# Patient Record
Sex: MALE | Race: WHITE | URBAN - METROPOLITAN AREA
[De-identification: names, ages, dates, MRNs, and addresses within clinical notes are randomized per-mention and may not be internally consistent; named-entity substitution may affect disease eponyms.]

---

## 2017-02-10 ENCOUNTER — TRANSFERRED RECORDS (OUTPATIENT)
Dept: HEALTH INFORMATION MANAGEMENT | Facility: CLINIC | Age: 77
End: 2017-02-10

## 2017-02-12 ENCOUNTER — TRANSFERRED RECORDS (OUTPATIENT)
Dept: HEALTH INFORMATION MANAGEMENT | Facility: CLINIC | Age: 77
End: 2017-02-12

## 2017-04-05 ENCOUNTER — TRANSFERRED RECORDS (OUTPATIENT)
Dept: HEALTH INFORMATION MANAGEMENT | Facility: CLINIC | Age: 77
End: 2017-04-05

## 2017-05-03 ENCOUNTER — TRANSFERRED RECORDS (OUTPATIENT)
Dept: HEALTH INFORMATION MANAGEMENT | Facility: CLINIC | Age: 77
End: 2017-05-03

## 2017-07-07 ENCOUNTER — TELEPHONE (OUTPATIENT)
Dept: TRANSPLANT | Facility: CLINIC | Age: 77
End: 2017-07-07

## 2017-07-07 NOTE — TELEPHONE ENCOUNTER
Send selection committee note from 12/2/2015 faxed to dialysis [phone].  Pt is on dialysis now started in 2/2017  7290 form arrived give to Mi to file in [facility].

## 2017-07-19 ENCOUNTER — TRANSFERRED RECORDS (OUTPATIENT)
Dept: HEALTH INFORMATION MANAGEMENT | Facility: CLINIC | Age: 77
End: 2017-07-19

## 2017-08-28 ENCOUNTER — TRANSFERRED RECORDS (OUTPATIENT)
Dept: HEALTH INFORMATION MANAGEMENT | Facility: CLINIC | Age: 77
End: 2017-08-28

## 2017-09-01 ENCOUNTER — TELEPHONE (OUTPATIENT)
Dept: TRANSPLANT | Facility: CLINIC | Age: 77
End: 2017-09-01

## 2017-09-01 NOTE — TELEPHONE ENCOUNTER
"Willing donor called asking how to donate directly with Alessandro Moses.  I connected donor with the donor number.    I called [facility] [phone]; Alessandro Moses could not do Home Hemo and was transferred back to [facility] in [facility] unit MWF 5-9 am.  [phone]  [phone].      I called [facility] Pt has Sg Clark MD Nephrologist. I asked for recent note and HT 70\" WT 119kg. (262#) BMI 37.      Alessandro Moses was not placed on Wait List in 2015 as his BMI was greater than team allowed and his heart needed assessment.    He understands that there will need to be updating prior to him being approved as kidney candidate.  I sent this note to the donor team.     PLAN   1) Called Alessandro to discuss his current situation.  He has lost weight and is HT 5'10\" .6/(262) BMI 37   2) I will send Romy's note of 2015 to ALANA Bennett to review for updating his eval in 2017  3) Sent Note to Sofia Ferguson asking for INS update on Alessandro Moses for updating evaluation and kidney tx/ ( pt is now on Hemodialysis 2/12/2017)  4) PRA needed   5) RTC to see Dr. Patel ( obtain outside fax stress echocardiogram and Cards note from [facility]/HP from Mariam or Sofia hines)  "

## 2017-09-05 ENCOUNTER — DOCUMENTATION ONLY (OUTPATIENT)
Dept: TRANSPLANT | Facility: CLINIC | Age: 77
End: 2017-09-05

## 2017-09-11 ENCOUNTER — DOCUMENTATION ONLY (OUTPATIENT)
Dept: TRANSPLANT | Facility: CLINIC | Age: 77
End: 2017-09-11

## 2017-09-18 ENCOUNTER — TELEPHONE (OUTPATIENT)
Dept: TRANSPLANT | Facility: CLINIC | Age: 77
End: 2017-09-18

## 2017-09-18 DIAGNOSIS — E11.9 DIABETES MELLITUS, TYPE 2 (H): ICD-10-CM

## 2017-09-18 DIAGNOSIS — Z76.82 ORGAN TRANSPLANT CANDIDATE: ICD-10-CM

## 2017-09-18 DIAGNOSIS — N18.6 ESRD (END STAGE RENAL DISEASE) (H): Primary | ICD-10-CM

## 2017-09-18 NOTE — LETTER
September 19, 2017    Bety Moses  [address]  [facility] [address]      Dear Mr. Moses,    Attached is your Pre Kidney Evaluation schedule on November 9, 2017. Please feel free to contact at [phone], me if you have any questions.      Sincerely,   Shivani JENNINGS    CC:Kallie BAY                                                  [facility]  [address] [place] [address]  [id number]        EVALUATION SCHEDULE: KIDNEY TRANSPLANT SLOT 2 EVAL      Patient:   BETY MOSES   MR#:    [id number]  Coordinator:  Kallie BAY     [phone]  :   Shivani JENNINGS     [phone]  Location:   [facility] Clinic 3A  Date:    November 9, 2017      This is your evaluation schedule, please follow dates and times.  You  will receive reminder phone calls for other tests, but please follow this  schedule only!  If you have any questions about dates and times,  please call us on number listed above.  Thank you, Transplant Clinic.       Day/Date:  Thursday, November 9, 2017  Time Location Activity   8:30a.m. [facility]  Imaging and Lab Testing  1st floor Blood tests    9:00a.m. [facility]  Transplant Services  3rd floor; Clinic 3B Appointment with Dr. Barajas,  Transplant Nephrologist   9:45a.m.-  10:15a.m. [facility]  Transplant Services  3rd floor; Clinic 3A; Consult Room Appointment with Maria Eugenia Negro,  Registered Dietitian   11:00a.m. [facility]  Transplant Services  3rd floor; Clinic 3A Appointment with Dr. Qureshi,  Transplant Surgeon

## 2017-09-18 NOTE — PROGRESS NOTES
ALANA Bennett reviewed previous evaluation and advised the following appts:  Surgery,   Maria Eugenia, and   Nephrology [facility]   cardiology, RTC SALINAS after the block day, to determine if he is still a candidate or not.  PRA     I called Alessandro Moses 9/18/2017 to discuss the Wait list in [facility].  Mr. Moses states he is on the [facility] Wait List.  This is a different list from [facility].  I informed Alessandro he is not on the [facility] Wait list.  The team recommends he have the above appts first to determine if he is candidate.  I faxed the Wait Time transfer form to him at his office fax: [phone].  I asked him to call Kallie when it arrives to discuss his options.  He has right to keep his time at [facility] ([facility] program with [employer] regulations.  When [facility] determines if he is a candidate then he can transfer time to our program.  If [facility] does not approve him then he can continue to work with [facility].  Lillian Johnson     Orders placed to Shivani 9/18/2017 for above items.

## 2017-09-20 ENCOUNTER — COMMITTEE REVIEW (OUTPATIENT)
Dept: TRANSPLANT | Facility: CLINIC | Age: 77
End: 2017-09-20

## 2017-09-20 NOTE — COMMITTEE REVIEW
Abdominal Patient Discussion Note Transplant Coordinator: Kallie Hinton  Transplant Surgeon:   Matias Mcginnis    Referring Physician: Lester Perry    Committee Review Members:  Nephrology Jono Montes MD, Romain Samuel, APRN CNP, Jose Elias Banks MD   Nutrition Joanne Negro, RD   Pharmacy Luly Trinh, [facility]    - Clinical Jigna Luque, [facility], Monserrat Downs, [facility]   Transplant Lillian White PA-C, Maddy Newman LPN, Marilyn Zheng, CLARENCE, Prema Khan, PAULETTE, Kallie Hinton, RN, Alyson España, PAULETTE, Alyson Pride, PAULETTE, Maria Eugenia Echevarria, PAULETTE, Sven Ortiz MD       Additional Discussion Notes and Findings:      Recommends he sees surgery, Nephrology team member, Cards and PRA   Rediscussion once these testings are done     Appts in Epic for 11/9/2017  Lab 8:30 am ok to eat    Dietary 9am  9:45  nephrology   Surgeon 10:30     I called Berry and MARIA ELENA stating that the team will rediscuss his case Wednesday after this reevaluation day.

## 2017-09-21 ENCOUNTER — TELEPHONE (OUTPATIENT)
Dept: TRANSPLANT | Facility: CLINIC | Age: 77
End: 2017-09-21

## 2017-09-22 DIAGNOSIS — Z76.82 ORGAN TRANSPLANT CANDIDATE: ICD-10-CM

## 2017-09-22 DIAGNOSIS — N18.6 END STAGE RENAL DISEASE (H): ICD-10-CM

## 2017-09-27 ENCOUNTER — CARE COORDINATION (OUTPATIENT)
Dept: CARDIOLOGY | Facility: CLINIC | Age: 77
End: 2017-09-27

## 2017-09-27 DIAGNOSIS — Z01.810 PRE-OPERATIVE CARDIOVASCULAR EXAMINATION: ICD-10-CM

## 2017-09-27 DIAGNOSIS — Z01.818 PRE-TRANSPLANT EVALUATION FOR KIDNEY TRANSPLANT: ICD-10-CM

## 2017-09-27 DIAGNOSIS — I20.89 EXERTIONAL ANGINA (H): Primary | ICD-10-CM

## 2017-09-27 NOTE — PROGRESS NOTES
Patient is being followed by cardiology for cardiac evaluation prior to possible kidney transplant.  Outside records reviewed by Dr. Patel.  Dr. Patel is recommending a Lexiscan to assess heart.  Patient notified and is agreeable to plan.  Patient would like to have this done through his local cardiologist and [facility].  Orders sent to patient via e-mail. Patient given contact number and all questions answered to patient's satisfaction.  Patient will have results faxed to Dr. Patel here in clinic.

## 2017-11-02 ENCOUNTER — TELEPHONE (OUTPATIENT)
Dept: TRANSPLANT | Facility: CLINIC | Age: 77
End: 2017-11-02

## 2017-11-02 NOTE — TELEPHONE ENCOUNTER
Alessandro Solorio is faxing his jam; please give to me. I will need to fax to Dr. Patel's office too. Jam Lama to come to you Dr. Patel soon.  I understand Dr. Patel cannot see it in care everywhere.  If you need more records, let me know I can call Mariam  at .   Kallie     Alessandro called to report he met [facility]. He has appt with Dr. Jenn Del Cid, Dr. Qureshi on 11/9.  Dr. Jenn Mancia

## 2017-11-03 ENCOUNTER — TELEPHONE (OUTPATIENT)
Dept: TRANSPLANT | Facility: CLINIC | Age: 77
End: 2017-11-03

## 2017-11-03 NOTE — TELEPHONE ENCOUNTER
Lexiscan report was incomplete fax. I called Mariam  [phone] for Chantal and Cards notes, she will fax cards appt note of 9/2016, 5/2017 and chantal from 8/5/2016 to [place] office.  I will send to scanning.

## 2017-11-08 ENCOUNTER — TELEPHONE (OUTPATIENT)
Dept: TRANSPLANT | Facility: CLINIC | Age: 77
End: 2017-11-08

## 2017-11-08 NOTE — TELEPHONE ENCOUNTER
Alsesandro called asking what his appt times are for Thursday 11/9/2017.  I informed him of times.

## 2017-11-09 ENCOUNTER — OFFICE VISIT (OUTPATIENT)
Dept: TRANSPLANT | Facility: CLINIC | Age: 77
End: 2017-11-09
Attending: INTERNAL MEDICINE
Payer: MEDICARE

## 2017-11-09 VITALS
DIASTOLIC BLOOD PRESSURE: 68 MMHG | HEIGHT: 71 IN | OXYGEN SATURATION: 99 % | WEIGHT: 252.8 LBS | SYSTOLIC BLOOD PRESSURE: 121 MMHG | HEART RATE: 101 BPM | BODY MASS INDEX: 35.39 KG/M2 | TEMPERATURE: 98.4 F

## 2017-11-09 DIAGNOSIS — Z76.82 ORGAN TRANSPLANT CANDIDATE: ICD-10-CM

## 2017-11-09 DIAGNOSIS — E27.9 ADRENAL NODULE (H): ICD-10-CM

## 2017-11-09 DIAGNOSIS — N18.6 END STAGE KIDNEY DISEASE (H): Primary | ICD-10-CM

## 2017-11-09 DIAGNOSIS — I25.118 CORONARY ARTERY DISEASE OF NATIVE HEART WITH STABLE ANGINA PECTORIS, UNSPECIFIED VESSEL OR LESION TYPE (H): ICD-10-CM

## 2017-11-09 DIAGNOSIS — N18.6 ESRD (END STAGE RENAL DISEASE) (H): Primary | ICD-10-CM

## 2017-11-09 DIAGNOSIS — E66.01 MORBID OBESITY (H): ICD-10-CM

## 2017-11-09 DIAGNOSIS — Z01.818 PRE-TRANSPLANT EVALUATION FOR ESRD (END STAGE RENAL DISEASE): ICD-10-CM

## 2017-11-09 DIAGNOSIS — E11.9 DIABETES MELLITUS, TYPE 2 (H): ICD-10-CM

## 2017-11-09 DIAGNOSIS — Z76.82 ORGAN TRANSPLANT CANDIDATE: Primary | ICD-10-CM

## 2017-11-09 PROCEDURE — 36415 COLL VENOUS BLD VENIPUNCTURE: CPT | Performed by: PHYSICIAN ASSISTANT

## 2017-11-09 NOTE — PROGRESS NOTES
RE: Alessandro Moses    [facility]# [id number]        I saw your patient, Alessandro Moses, in consultation in our pretransplant clinic.  He was at clinic to discuss care for his end-stage renal disease.  He was at clinic with his wife.  He had previously been at class, so did not attendour pretransplant class today.       We talked about the pros and cons of transplantation vs. dialysis.  We discussed the fact that it was important that he think about the pros and cons of each treatment option and make an active decision.  We also discussed the fact that the two were interconnected and he may need to go on dialysis before transplant (if he chose to have a transplant) and that if the transplant failed, he might need dialysis before another transplant.       We also discussed the fact that if he chose to have a transplant, he would need to decide between going on the wait list for a  donor transplant vs. having a living donor transplant.  We talked about the pros and cons of each option.  Although I didn't express an opinion regarding transplantation or dialysis, I suggested that if he chose to have a transplant, a living donor transplant would be preferable in that the surgery is the same, the immunosuppressive drugs and the risks are the same, but the transplant could be done sooner and the results are better.  I told him that the wait for  donor kidney was approximately five years for patients who are newly put on the waiting list.  In addition, we talked about the fact that the disadvantage of a living donor transplant was the risk to the donor.       Because he was interested in living donation, we spent some time discussing donor risks, including the risks of mortality, morbidity, and long-term risks with a single kidney.     Finally, we had considerable discussion as to whether or not he would benefit by moving forward with a transplant. I asked him to discuss this with his nephrologist. This is  "important given he says that he is tolerating dialysis well. Given his age and the current waiting time for a  donor transplant, I would think that the only option would be a living donor transplant. However, I think that this is realistically a \"+/-\" choice for him.     I attempted to answer any remaining questions.  I also told him that should he have any questions, he should feel free to contact us.  We would be glad to answer any questions either over the phone or at another clinic visit.  His transplant coordinator is Kallie Hinton and may be reached at [phone].  Thank you for the opportunity to see him.     I spent 25 minutes with this patient.  Over 90% of that time was spent in counseling and coordination of care.             Yours truly,               Paresh Qureshi MD         Professor of Surgery         ([phone])    RASHID/  "

## 2017-11-09 NOTE — MR AVS SNAPSHOT
After Visit Summary   11/9/2017    Alessandro Moses    MRN: [id number]           Patient Information     Date Of Birth          1940        Visit Information        Provider Department      11/9/2017 9:45 AM Joanne Negro RD [facility] Solid Organ Transplant        Today's Diagnoses     Organ transplant candidate    -  1       Follow-ups after your visit        Your next 10 appointments already scheduled     Nov 30, 2017 12:30 PM CST   (Arrive by 12:15 PM)   Return Visit with Jose Patel MD   [facility]    [address] [place] [address] [phone]              Who to contact     If you have questions or need follow up information about today's clinic visit or your schedule please contact [facility] SOLID ORGAN TRANSPLANT directly at [phone].  Normal or non-critical lab and imaging results will be communicated to you by allyDVMhart, letter or phone within 4 business days after the clinic has received the results. If you do not hear from us within 7 days, please contact the clinic through allyDVMhart or phone. If you have a critical or abnormal lab result, we will notify you by phone as soon as possible.  Submit refill requests through Enviable Abode or call your pharmacy and they will forward the refill request to us. Please allow 3 business days for your refill to be completed.          Additional Information About Your Visit        MyChart Information     Enviable Abode gives you secure access to your electronic health record. If you see a primary care provider, you can also send messages to your care team and make appointments. If you have questions, please call your primary care clinic.  If you do not have a primary care provider, please call [phone] and they will assist you.        Care EveryWhere ID     This is your Care EveryWhere ID. This could be used by other organizations to access your [facility]  records  [id number]         Blood Pressure from Last 3 Encounters:   11/09/17 121/68   03/22/16 157/75   03/14/16 148/74    Weight from Last 3 Encounters:   11/09/17 114.7 kg (252 lb 12.8 oz)   03/22/16 131.1 kg (289 lb)   03/14/16 126.6 kg (279 lb)              Today, you had the following     No orders found for display       Primary Care Provider Office Phone # Fax #    Hebert Anand [phone]       [facility] [address]  [facility] [address]        Equal Access to Services     [facility]: Hadii benjamin bill hadsigifredoo Somyrtle, waaxda luqadaha, qaybta kaalmada adeelieryada, leah nathan . So [facility] [phone].    ATENCIÓN: Si habla español, tiene a reno disposición servicios gratuitos de asistencia lingüística. [facility] [phone].    We comply with applicable federal civil rights laws and [place] laws. We do not discriminate on the basis of race, color, national origin, age, disability, sex, sexual orientation, or gender identity.            Thank you!     Thank you for choosing [facility] SOLID ORGAN TRANSPLANT  for your care. Our goal is always to provide you with excellent care. Hearing back from our patients is one way we can continue to improve our services. Please take a few minutes to complete the written survey that you may receive in the mail after your visit with us. Thank you!             Your Updated Medication List - Protect others around you: Learn how to safely use, store and throw away your medicines at www.disposemymeds.org.          This list is accurate as of: 11/9/17  2:07 PM.  Always use your most recent med list.                   Brand Name Dispense Instructions for use Diagnosis    alfuzosin 10 MG 24 hr tablet    UROXATRAL     Take 10 mg by mouth        allopurinol 100 MG tablet    ZYLOPRIM     TAKE TWO TABLETS BY MOUTH ONCE DAILY        aspirin 81 MG chewable tablet      Take 81 mg by mouth        blood glucose monitoring test strip     no brand specified     Test 6-7 times/day using insulin pump.  [employer] WARNER approved 7/8/14  Uses Free style test strips        clopidogrel 75 MG tablet    PLAVIX     TAKE ONE TABLET BY MOUTH EVERY DAY        CRESTOR 10 MG tablet   Generic drug:  rosuvastatin      TAKE ONE TABLET BY MOUTH ONCE DAILY        doxazosin 8 MG tablet    CARDURA     Take 4 mg by mouth        finasteride 5 MG tablet    PROSCAR     Take 5 mg by mouth        fluticasone 50 MCG/BLIST Aepb    FLOVENT DISKUS     Inhale 1 puff into the lungs every 12 hours        insulin reg HIGH CONC 500 Units/mL injection    HumuLIN R U-500 HIGH CONC     Use 35-40 units Regular U-500 insulin per day via insulin pump (actual 175-200 units/day)        Isosorbide Mononitrate  MG Tb24      Take 120 mg by mouth        losartan 100 MG tablet    COZAAR     Take 100 mg by mouth        Multiple vitamin Tabs           nitroGLYcerin 0.4 MG sublingual tablet    NITROSTAT     Place 1 tab under tongue as needed for chest pain up to 3 doses, 5 min appart        omega 3 1200 MG Caps           oxyCODONE-acetaminophen 5-325 MG per tablet    PERCOCET     Take by mouth every 4 hours as needed for moderate to severe pain        RANEXA 500 MG 12 hr tablet   Generic drug:  ranolazine      Take 1,000 mg by mouth 2 times daily        RENVELA PO           torsemide 20 MG tablet    DEMADEX     Take 20mg in morning and 20 mg  in evening        VITAMIN D3 PO      Take by mouth daily

## 2017-11-09 NOTE — MR AVS SNAPSHOT
After Visit Summary   11/9/2017    Alessandro Moses    MRN: [id number]           Patient Information     Date Of Birth          1940        Visit Information        Provider Department      11/9/2017 9:00 AM BRIGITTE DELANEY PATIENT 2 [facility] Solid Organ Transplant        Today's Diagnoses     End stage kidney disease (H)    -  1    Pre-transplant evaluation for ESRD (end stage renal disease)        Coronary artery disease of native heart with stable angina pectoris, unspecified vessel or lesion type (H)        Morbid obesity (H)        Adrenal nodule (H)           Follow-ups after your visit        Your next 10 appointments already scheduled     Nov 30, 2017 12:30 PM CST   (Arrive by 12:15 PM)   Return Visit with Jose Patel MD   [facility])    [address] [place] [address] [phone]              Who to contact     If you have questions or need follow up information about today's clinic visit or your schedule please contact [facility] SOLID ORGAN TRANSPLANT directly at [phone].  Normal or non-critical lab and imaging results will be communicated to you by Cell-A-Spothart, letter or phone within 4 business days after the clinic has received the results. If you do not hear from us within 7 days, please contact the clinic through Calibrust or phone. If you have a critical or abnormal lab result, we will notify you by phone as soon as possible.  Submit refill requests through VISENZE or call your pharmacy and they will forward the refill request to us. Please allow 3 business days for your refill to be completed.          Additional Information About Your Visit        Cell-A-Spothart Information     VISENZE gives you secure access to your electronic health record. If you see a primary care provider, you can also send messages to your care team and make appointments. If you have questions, please call your primary care clinic.  If you do  "not have a primary care provider, please call [phone] and they will assist you.        Care EveryWhere ID     This is your Care EveryWhere ID. This could be used by other organizations to access your [place] medical records  [id number]        Your Vitals Were     Pulse Temperature Height Pulse Oximetry BMI (Body Mass Index)       101 98.4  F (36.9  C) 1.803 m (5' 11\") 99% 35.26 kg/m2        Blood Pressure from Last 3 Encounters:   11/09/17 121/68   03/22/16 157/75   03/14/16 148/74    Weight from Last 3 Encounters:   11/09/17 114.7 kg (252 lb 12.8 oz)   03/22/16 131.1 kg (289 lb)   03/14/16 126.6 kg (279 lb)              Today, you had the following     No orders found for display       Primary Care Provider Office Phone # Fax #    Hebert Anand [phone]       [place] [address] [address] [facility] [address]        Equal Access to Services     [facility]: Hadii aad ku hadasho Soomaali, waaxda luqadaha, qaybta kaalmada adeegyada, leah nathan . So [facility] [phone].    ATENCIÓN: Si habla español, tiene a reno disposición servicios gratuitos de asistencia lingüística. Llame al [phone].    We comply with applicable federal civil rights laws and [place] laws. We do not discriminate on the basis of race, color, national origin, age, disability, sex, sexual orientation, or gender identity.            Thank you!     Thank you for choosing [facility] SOLID ORGAN TRANSPLANT  for your care. Our goal is always to provide you with excellent care. Hearing back from our patients is one way we can continue to improve our services. Please take a few minutes to complete the written survey that you may receive in the mail after your visit with us. Thank you!             Your Updated Medication List - Protect others around you: Learn how to safely use, store and throw away your medicines at www.disposemymeds.org.          This list is accurate as of: 11/9/17 11:59 " PM.  Always use your most recent med list.                   Brand Name Dispense Instructions for use Diagnosis    alfuzosin 10 MG 24 hr tablet    UROXATRAL     Take 10 mg by mouth        allopurinol 100 MG tablet    ZYLOPRIM     TAKE TWO TABLETS BY MOUTH ONCE DAILY        aspirin 81 MG chewable tablet      Take 81 mg by mouth        blood glucose monitoring test strip    no brand specified     Test 6-7 times/day using insulin pump.  Nancy WARNER approved 7/8/14  Uses Free style test strips        clopidogrel 75 MG tablet    PLAVIX     TAKE ONE TABLET BY MOUTH EVERY DAY        CRESTOR 10 MG tablet   Generic drug:  rosuvastatin      TAKE ONE TABLET BY MOUTH ONCE DAILY        doxazosin 8 MG tablet    CARDURA     Take 4 mg by mouth        finasteride 5 MG tablet    PROSCAR     Take 5 mg by mouth        fluticasone 50 MCG/BLIST Aepb    FLOVENT DISKUS     Inhale 1 puff into the lungs every 12 hours        insulin reg HIGH CONC 500 Units/mL injection    HumuLIN R U-500 HIGH CONC     Use 35-40 units Regular U-500 insulin per day via insulin pump (actual 175-200 units/day)        Isosorbide Mononitrate  MG Tb24      Take 120 mg by mouth        losartan 100 MG tablet    COZAAR     Take 100 mg by mouth        Multiple vitamin Tabs           nitroGLYcerin 0.4 MG sublingual tablet    NITROSTAT     Place 1 tab under tongue as needed for chest pain up to 3 doses, 5 min appart        omega 3 1200 MG Caps           oxyCODONE-acetaminophen 5-325 MG per tablet    PERCOCET     Take by mouth every 4 hours as needed for moderate to severe pain        RANEXA 500 MG 12 hr tablet   Generic drug:  ranolazine      Take 1,000 mg by mouth 2 times daily        RENVELA PO           torsemide 20 MG tablet    DEMADEX     Take 20mg in morning and 20 mg  in evening        VITAMIN D3 PO      Take by mouth daily

## 2017-11-09 NOTE — PROGRESS NOTES
Assessment and Plan:  1. Kidney transplant evaluation - patient is a marginal candidate overall. Benefits of a living donor transplant were discussed.  2. ESKD from presumed diabetic nephropathy - doing well on dialysis since February 2017. We discussed that although transplant may not prolong his life, it may potentially improve his overall quality of life. He is unaware of any potential living donors at this time. He is ABO A and cPRA pending.   3. Type 2 diabetes - currently managed with U500 via insulin pump. No hypoglycemic unawareness. He is aware that he would likely require more insulin post-kidney transplant.  4. Cardiac risk - he has history of significant CAD s/p CABG in 1992 and stenting in 2000, 2004, and 2014. His last stress test in August 2016 showed an anteroseptal defect that was fixed and thought to be consistent with prior MI. His last ECHO in February 2017 showed an EF of 55%, mild aortic stenosis, and severe LA dilatation. PA pressures were not able to be accurately assessed. He is currently on Plavix and aspirin. He would need cardiac risk assessment.   5. BMI 35 but with significant abdominal obesity - we discussed that he may have more weight to lose to be considered for transplant given central obesity. Appreciate transplant surgery and dietician input. We discussed importance of weight loss for overall general health and to help reduce postoperative wound complications.  6. PVD and possible claudication symptoms - imaging 10+ years ago indicated mild occlusive disease of his BLE. He would need further iliac/femoral imaging to assess vascular targets for transplant.  7. Functional status - limited to walking 25-50 feet by chronic exertional dyspnea and BLE peripheral neuropathy. He is currently following with neurology for peripheral neuropathy and not taking any medications for this. Encouraged him to continue following with neurology. Symptoms may be due to claudication, and as above, he  would need iliac/femoral imaging should he be a transplant candidate. His symptoms could be addressed by his PCP if he is not a candidate.   8. Adrenal adenoma - stable in size since at least 2009. Followed by his local endocrinologist.  9. Health maintenance - would need records of most recent colonoscopy to determine if it is up to date.     Discussed the risks and benefits of a transplant, including the risk of surgery and immunosuppression medications.  Patients overall evaluation will be discussed in the Transplant Program's regular meeting with a final recommendation on the patients suitability for transplant to be made at that time.  Patient was seen in conjunction with Dr. Williams Montes as part of a shared visit.      Evaluation:  Alessandro Moses was seen in consultation at the request of Dr. Paresh Qureshi for evaluation as a potential kidney transplant recipient.    Reason for Visit:  Alessandro Moses is a 77-year-old male with ESKD from presumed diabetic nephropathy, who presents for kidney transplant evaluation.    HPI:  Alessandro Moses is a 77-year-old male with ESKD from presumed diabetic nephropathy on dialysis since February 2017, type 2 diabetes since 2000 (also complicated by peripheral neuropathy), CAD s/p 2 vessel CABG in 1992 and stenting post-CABG (2000, 2004, and 2014), PVD (mild occlusive disease BLE on 2003 arterial doppler), hypertension, obesity, adrenal adenoma, nephrolithiasis (s/p basket retrieval 2003), BENNY, chronic back pain s/p T7-L1 spinal fusion and history of laminectomy and foraminotomies, gout (on allopurinol), and BPH who presents for continued kidney transplant evaluation. He was originally evaluated in May 2015 but not listed due to high BMI (40 around time of initial evaluation).    He has since started dialysis in February 2017, which has been going well. He is currently using an AVF and still has a CVC in place. He has a small amount of urine output. No dysuria, hematuria, or  trouble emptying his bladder. He has intentionally lost 55 lbs, some of which he believes was fluid weight that came off with initiation of dialysis. His appetite has been down some since starting dialysis but he still manages to eat several small meals per day. He has intermittent nausea and vomiting. No diarrhea or constipation. Nausea improves with eating or drinking something and does not affect his ability to take his medications. He is currently using an insulin pump with U500. He checks blood sugars 3-5 times per day with typical sugars averaging around 130-140. He continues to follow with endocrinology for this, as well as for a right-sided adrenal adenoma that has been stable in size since at least 2009. He recently fell and sustained a T4/5 fracture that is being managed conservatively by neurosurgery and does not limit him physically. He is, however, limited physically by BLE peripheral neuropathy and chronic exertional dyspnea that has been present for several years. He is only able to walk 25-50 feet before needing to rest. He also notes that he experiences BLE pain with exertion that is different in quality from his peripheral neuropathy. He has not experienced chest pain with exertion since starting on ranolazine in 2015. He has not required sublingual nitroglycerin for several months. His last coronary angiogram requiring stenting was in December 2014 and showed LMCA 20% stenosis, 40% stenosis mid and distal LAD after LIMA touchdown, LCx stents patent, and proximal RCA 50-60% stenosis (JOSE D at that time). His last stress test in August 2016 showed an anteroseptal defect that was fixed and thought to be consistent with prior MI. His last ECHO in February 2017 showed an EF of 55%, mild aortic stenosis, and severe LA dilatation. PA pressures were not able to be accurately assessed. He is currently on Plavix and aspirin. Overall, he is doing okay. 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 He is also quite active in the community. His energy level is stable but does dip on dialysis days. He remains interested in transplant for improved quality of life.         Kidney Disease Hx:        Kidney Disease Dx: presumed diabetic nephropathy       Biopsy Proven: No         On Dialysis: Yes, Date initiated: February 2017 and Dialysis Type: Incenter HD;       Primary Nephrologist: Dr. Clark         [facility] Hx:       h/o HTN: Yes         h/o DM:  Yes        h/o Protein in Urine: Yes        h/o Blood in Urine:  No       h/o Kidney Stones:  Yes        h/o UTI: No       h/o Chronic NSAID Use: No         Previous Transplant Hx:        No         Transplant Sensitization Hx:       Previous Tx: No       Blood Transfusion: Does not know             Uremic Symptoms:       Fatigue: Yes; Cold: No; Nausea: No; Poor Appetite: No; Metallic Taste: No; Edema: No;         Cardiovascular Hx:       h/o Cardiac Issues: Yes; CAD s/p CABG and stenting       Exercise Tolerance: shortness of breath with exertion.         Health Maintenance:       Colonoscopy: need records and Dental: need records         Potential Donor(s): No    ROS:  A comprehensive review of systems was obtained and negative, except as noted in the HPI or PMH.    PMH:   Medical record was reviewed and PMH was discussed with patient and noted below.  Past Medical History:   Diagnosis Date     Adrenal adenoma      Anemia in CKD (chronic kidney disease)      Angina pectoris (H)      Coronary artery disease      Diabetes mellitus, type II (H)      End stage kidney disease (H)      Gallstones      Gout 6/4/2004     Hyperlipidemia      Hypertension      Kidney stone      Lumbar stenosis      Obesity (BMI 35.0-39.9 without comorbidity)      BENNY (obstructive sleep apnea)      Peripheral vascular disease (H)      Pure hypercholesterolemia      Right lumbar radiculopathy      Secondary hyperparathyroidism (H)        PSH:   Past Surgical History:    Procedure Laterality Date     aortocoronary bypass  1992     CREATE FISTULA ARTERIOVENOUS UPPER EXTREMITY       JOINT REPLACEMENT Bilateral      LAMINECTOMY LUMBAR THREE+ LEVELS  4/29/12    with bilateral foraminotomies L3-L4 and L4-L5     Personal or family history of bleeding or anesthesia problems: No    Family Hx:  Family History   Problem Relation Age of Onset     Coronary Artery Disease Father      DIABETES Father        Personal Hx:   Social History     Social History     Marital status:      Spouse name: N/A     Number of children: N/A     Years of education: N/A     Occupational History     Not on file.     Social History Main Topics     Smoking status: Never Smoker     Smokeless tobacco: Not on file     Alcohol use Not on file     Drug use: Not on file     Sexual activity: Not on file     Other Topics Concern     Not on file     Social History Narrative       Allergies:  Allergies   Allergen Reactions     Hydrocodone-Acetaminophen Nausea and Vomiting       Medications:  Prior to Admission medications    Medication Sig Start Date End Date Taking? Authorizing Provider   Sevelamer Carbonate (RENVELA PO)     Reported, Patient   Cholecalciferol (VITAMIN D3 PO) Take by mouth daily    Reported, Patient   oxyCODONE-acetaminophen (PERCOCET) 5-325 MG per tablet Take by mouth every 4 hours as needed for moderate to severe pain    Reported, Patient   fluticasone (FLOVENT DISKUS) 50 MCG/BLIST AEPB Inhale 1 puff into the lungs every 12 hours    Reported, Patient   allopurinol (ZYLOPRIM) 100 MG tablet TAKE TWO TABLETS BY MOUTH ONCE DAILY 1/22/15   Reported, Patient   clopidogrel (PLAVIX) 75 MG tablet TAKE ONE TABLET BY MOUTH EVERY DAY 8/14/14   Reported, Patient   aspirin 81 MG chewable tablet Take 81 mg by mouth 3/1/13   Reported, Patient   rosuvastatin (CRESTOR) 10 MG tablet TAKE ONE TABLET BY MOUTH ONCE DAILY 4/29/15   Reported, Patient   doxazosin (CARDURA) 8 MG tablet Take 4 mg by mouth  2/13/15   Reported,  "Patient   finasteride (PROSCAR) 5 MG tablet Take 5 mg by mouth 3/23/15   Reported, Patient   insulin reg HIGH CONC (HUMULIN R U-500 HIGH CONC) 500 Units/mL VIAL Use 35-40 units Regular U-500 insulin per day via insulin pump (actual 175-200 units/day) 7/8/14   Reported, Patient   Isosorbide Mononitrate  MG TB24 Take 120 mg by mouth 8/25/14   Reported, Patient   losartan (COZAAR) 100 MG tablet Take 100 mg by mouth 8/25/14   Reported, Patient   Multiple vitamin TABS  10/5/06   Reported, Patient   omega 3 1200 MG CAPS  1/21/15   Reported, Patient   nitroglycerin (NITROSTAT) 0.4 MG SL tablet Place 1 tab under tongue as needed for chest pain up to 3 doses, 5 min appart 12/19/14   Reported, Patient   ranolazine (RANEXA) 500 MG 12 hr tablet Take 1,000 mg by mouth 2 times daily  1/21/15   Reported, Patient   torsemide (DEMADEX) 20 MG tablet Take 20mg in morning and 20 mg  in evening 2/17/15   Reported, Patient   blood glucose test strip Test 6-7 times/day using insulin pump.  [facility] approved 7/8/14  Uses Free style test strips 12/4/14   Reported, Patient   alfuzosin (UROXATRAL) 10 MG 24 hr tablet Take 10 mg by mouth 9/23/14   Reported, Patient       Vitals:  /68  Pulse 101  Temp 98.4  F (36.9  C)  Ht 1.803 m (5' 11\")  Wt 114.7 kg (252 lb 12.8 oz)  SpO2 99%  BMI 35.26 kg/m2    Exam:  GENERAL APPEARANCE: alert and no distress  HENT: mouth without ulcers or lesions. No sign of oral infection  LYMPHATICS: no cervical or supraclavicular nodes  RESP: lungs clear to auscultation - no rales, rhonchi or wheezes  CV: regular rhythm, normal rate, no rub, no murmur  EDEMA: no LE edema bilaterally  ABDOMEN: soft, nondistended, nontender, abdominal obesity  MS: extremities normal - no gross deformities noted, no evidence of inflammation in joints, no muscle tenderness  SKIN: no rash  Limited femoral pulses exam due to body habitus      "

## 2017-11-09 NOTE — MR AVS SNAPSHOT
After Visit Summary   11/9/2017    Alessandro RADER Sample    MRN: [id number]           Patient Information     Date Of Birth          1940        Visit Information        Provider Department      11/9/2017 11:00 AM BRIGITTE DELANEY PATIENT 2 [facility] Solid Organ Transplant        Today's Diagnoses     ESRD (end stage renal disease) (H)    -  1       Follow-ups after your visit        Your next 10 appointments already scheduled     Nov 30, 2017 12:30 PM CST   (Arrive by 12:15 PM)   Return Visit with Jose Patel MD   [facility]    [address] [place] [id number] [phone]              Who to contact     If you have questions or need follow up information about today's clinic visit or your schedule please contact [facility] directly at [phone].  Normal or non-critical lab and imaging results will be communicated to you by Rambushart, letter or phone within 4 business days after the clinic has received the results. If you do not hear from us within 7 days, please contact the clinic through Rambushart or phone. If you have a critical or abnormal lab result, we will notify you by phone as soon as possible.  Submit refill requests through [employer] or call your pharmacy and they will forward the refill request to us. Please allow 3 business days for your refill to be completed.          Additional Information About Your Visit        MyChart Information     [employer] gives you secure access to your electronic health record. If you see a primary care provider, you can also send messages to your care team and make appointments. If you have questions, please call your primary care clinic.  If you do not have a primary care provider, please call [phone] and they will assist you.        Care EveryWhere ID     This is your Care EveryWhere ID. This could be used by other organizations to access your [facility]  records  [id number]         Blood Pressure from Last 3 Encounters:   11/09/17 121/68   03/22/16 157/75   03/14/16 148/74    Weight from Last 3 Encounters:   11/09/17 114.7 kg (252 lb 12.8 oz)   03/22/16 131.1 kg (289 lb)   03/14/16 126.6 kg (279 lb)              Today, you had the following     No orders found for display       Primary Care Provider Office Phone # Fax #    Hebert Anand [phone]       [facility] [address]  [facility] [address]        Equal Access to Services     [facility]: Hadii benjamin bill hadsigifredoo Somyrtle, waaxda luqadaha, qaybta kaalmada adeelieryada, leah nathan . So [facility] [phone].    ATENCIÓN: Si habla español, tiene a reno disposición servicios gratuitos de asistencia lingüística. [facility] [phone].    We comply with applicable federal civil rights laws and [place] laws. We do not discriminate on the basis of race, color, national origin, age, disability, sex, sexual orientation, or gender identity.            Thank you!     Thank you for choosing [facility]  for your care. Our goal is always to provide you with excellent care. Hearing back from our patients is one way we can continue to improve our services. Please take a few minutes to complete the written survey that you may receive in the mail after your visit with us. Thank you!             Your Updated Medication List - Protect others around you: Learn how to safely use, store and throw away your medicines at www.disposemymeds.org.          This list is accurate as of: 11/9/17  3:20 PM.  Always use your most recent med list.                   Brand Name Dispense Instructions for use Diagnosis    alfuzosin 10 MG 24 hr tablet    UROXATRAL     Take 10 mg by mouth        allopurinol 100 MG tablet    ZYLOPRIM     TAKE TWO TABLETS BY MOUTH ONCE DAILY        aspirin 81 MG chewable tablet      Take 81 mg by mouth        blood glucose monitoring test strip     no brand specified     Test 6-7 times/day using insulin pump.  [facility] approved 7/8/14  Uses Free style test strips        clopidogrel 75 MG tablet    PLAVIX     TAKE ONE TABLET BY MOUTH EVERY DAY        CRESTOR 10 MG tablet   Generic drug:  rosuvastatin      TAKE ONE TABLET BY MOUTH ONCE DAILY        doxazosin 8 MG tablet    CARDURA     Take 4 mg by mouth        finasteride 5 MG tablet    PROSCAR     Take 5 mg by mouth        fluticasone 50 MCG/BLIST Aepb    FLOVENT DISKUS     Inhale 1 puff into the lungs every 12 hours        insulin reg HIGH CONC 500 Units/mL injection    HumuLIN R U-500 HIGH CONC     Use 35-40 units Regular U-500 insulin per day via insulin pump (actual 175-200 units/day)        Isosorbide Mononitrate  MG Tb24      Take 120 mg by mouth        losartan 100 MG tablet    COZAAR     Take 100 mg by mouth        Multiple vitamin Tabs           nitroGLYcerin 0.4 MG sublingual tablet    NITROSTAT     Place 1 tab under tongue as needed for chest pain up to 3 doses, 5 min appart        omega 3 1200 MG Caps           oxyCODONE-acetaminophen 5-325 MG per tablet    PERCOCET     Take by mouth every 4 hours as needed for moderate to severe pain        RANEXA 500 MG 12 hr tablet   Generic drug:  ranolazine      Take 1,000 mg by mouth 2 times daily        RENVELA PO           torsemide 20 MG tablet    DEMADEX     Take 20mg in morning and 20 mg  in evening        VITAMIN D3 PO      Take by mouth daily

## 2017-11-09 NOTE — PROGRESS NOTES
Patient was seen by myself, Dr. Jono Montes, in conjunction with Lillian White, as part of a shared visit.    I personally reviewed past medical and surgical history, vital signs, medications and labs.  Present and past medical history, along with significant physical exam findings were all reviewed with PORTER.    My zheng findings:  Alessandro Moses is a 77 year old year old male with ESKD from Type II Diabetes, who presents for Kidney transplant evaluation.    Patient is a 76 yo male with hx of ESRD due to DMII now on dialysis for the last year.     He is working on weight loss.    He has CAD s/p CABG with last angiogram in 2014?. He is on ranolozine chronically.     He continues to have chronic MONGE.    He denies cp. No n/v, no f/s/c.     Key management decisions made by me and discussed with PORTER:  1. Kidney transplant evaluation - patient is a poor candidate overall. Benefits of a living donor transplant were discussed. We spent the majority of our visit discussing mortality in a 76 yo dialysis patient with hx of CAD/DMII while waiting for a kidney as well as the likelihood of health deterioration. I voiced my concern given his limited functional status due to what his perception is neuropathy limiting his walking to 50 feet before he stops for pain of Lower extremities. We have recommended evaluation for PAD limiting his mobility in conjunction to neuropathy evaluation and management. Once optimized, I would risk stratify with a 6 minute walk. This is a non-invasive way of looking at functional ability. Walking >300 meters would be a minimum.   2. 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   3. CAD s/ cabg: needs cardiology assessment.  4. Morbid obesity: BMI now down to 35. Continue to work on weight loss.  5. Adrenal nodule: stable appearance on CT abdomen. Will continue to monitor.

## 2017-11-09 NOTE — PROGRESS NOTES
Outpatient MNT: Kidney Transplant Evaluation    Current BMI: 35.3  BMI is outside criteria of <35 for kidney transplant  Recommend pt reach BMI<35 or <251 lbs     Time Spent: 30 minutes  Visit Type: Follow up (last seen by RD 2015)  Referring Physician: Dr. Qureshi  Pt accompanied by: Wife    History of previous txp: None  Dialysis: Yes    Dialysis Modality: HD  Days/Times: MWF 5:45AM  Dialysis Start: February 2017  Pt receives protein supplement with dialysis: No - recently discussed trying protein bar at end of dialysis with providers at his dialysis clinic per pt, but has not tried this yet.     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 Taste changes began when he started dialysis. Pt also has episodes of nausea and dry heaving at home sometimes when his stomach feels empty. Per pt, another provider suspects possibly gastroparesis and recommended 4-5 small meals per day, which the patient tries to follow. Pt takes Renvela phos binder 1-2x per day, but prescribed to take 3x per day. On dialysis days, pt goes home and rests for an hour after, then eats something such as toast, glucerna, or a muffin. Pt checks blood sugar 3-5x per day. Pt and wife both follow 2g Na+ diet and 2g K+ diet well.     Appetite: Pt states appetite decreased for past 4-5 months and breakfast is the easiest for him to eat rather than other meals.     Vitamins, Supplements, Pertinent Meds: Vitamin D, Omega-3, Renal Vitamin (takes 2x per day)  Herbal Medicines/Supplements: None    Diet Recall  Breakfast Orange juice, rolled oats   Lunch Does not eat much for lunch or anything at times; string cheese w/ crackers; sometimes goes out for lunch; eats ~2-3 pm   Dinner Sometimes only eats minimal amount d/t no appetite; potatoes, gravy, roast beef ~3 oz., green beans, soup (canned and homemade), tenderloin and tator tots (ate 1/2)   Snacks  "None   Beverages 2 Diet Pepsi per week (used to drink 3-4 per day); Josh Nichole 1 can/day, 8 oz juice/day, water   Alcohol <1 drink per month   Dining out 1-2x per week      Physical Activity  Minimal (walking to and from car to work to home and around the office)      Anthropometrics  Height:   5' 11\" in   BMI:    35.3    Weight Status:Obesity Grade II BMI 35-39.9   Weight:  252 lbs            IBW (lb): 172   % IBW: 147%    Wt Hx: Pt is down 50-55 lbs since starting dialysis Feb. 12th (likely fluid losses). He also believes he has lost some actual weight as well d/t decreased appetite. His personal goal is to reach 220 lbs.    Adj/dosing BW: 192 lbs/ 87 kg       Labs  Recent Labs   Lab Test  05/14/15   0949   CHOL  132   HDL  35*   LDL  46   TRIG  255*   CHOLHDLRATIO  3.8     No results found for: A1C  Potassium   Date Value Ref Range Status   05/14/2015 4.6 3.4 - 5.3 mmol/L Final     Comment:     Specimen slightly hemolyzed, potassium may be falsely elevated   No recent K+ labs, but per pt, he was recently WNL at dialysis.    PHOSPHORUS: No recent lab taken - per pt, was a little high at dialysis    Malnutrition  % Intake: <75% for >/= 3 months (non-severe malnutrition)  % Weight Loss: Does not meet criteria - intentional wt loss for surgery  Subcutaneous Fat Loss: None observed  Muscle Loss: None observed  Fluid Accumulation/Edema: None noted  Malnutrition Diagnosis: Does not meet criteria.     Estimated Nutrition Needs  Energy  2348-1142 kcal/day     (20-25 kcal/kg dosing BW for desired wt loss)     Protein  104-122 g Pro/day    (1.2-1.4 g/kg for HD/PD needs)         Fluid  1 ml/kcal or per MD   Micronutrient   Na+: <2000 mg/day  K+: 9210-1074 mg/day  Phos: 800-1000 mg/day            Nutrition Diagnosis  Food and nutrition related knowledge deficit r/t pre kidney transplant eval AEB pt verbalized not hearing pre/post transplant diet guidelines.    Nutrition Intervention  Nutrition education " provided:  Discussed sodium intake (low sodium foods and drinks, seasoning food without salt and tips for low sodium diet). Also encouraged pt to get more protein in with each meal and snack and discussed strategies for increasing calories and protein while avoiding GI issues such as drinking water separately from eating meals and continuing to eat 4-5 small meals per day.     Encouraged pt to take binders as prescribed, bring them to office, etc. Also reviewed BMI guidelines for kidney transplant, and pt is right at the border currently. Pt is aware of this.     Reviewed post txp diet guidelines in brief (will review in further detail post txp):  (1) Review of proper food safety measures d/t immunosuppressant therapy post-op and increased risk for food-borne illness (2) Stressed importance of not taking any herbal/Chinese/alternative medicines or supplements post txp (d/t risk for rejection, unknown effects on the organs, potential interactions with immunosuppresants). (3) Med regimen and possible side effects    Patient Understanding: Pt verbalized understanding of education provided.  Expected Compliance: good  Follow-Up Plans: PRN     Nutrition Goals  1. Limit Na+ <2000mg/day  2. Ideal minimum of 104 g protein/day  3. Increase binder compliance to 3/day  4. BMI <35 or <251 lbs    Provided pt with contact info.   Leonie Matos RD, LD  Maria Eugenia Negro RD, LD

## 2017-11-09 NOTE — LETTER
11/9/2017       RE: Alessandro RADER Sample  [address]  [facility] [address]     Dear Colleague,    Thank you for referring your patient, Alessandro Moses, to the [facility] SOLID ORGAN TRANSPLANT at [facility]. Please see a copy of my visit note below.    Assessment and Plan:  1. Kidney transplant evaluation - patient is a marginal candidate overall. Benefits of a living donor transplant were discussed.  2. ESKD from presumed diabetic nephropathy - doing well on dialysis since February 2017. We discussed that although transplant may not prolong his life, it may potentially improve his overall quality of life. He is unaware of any potential living donors at this time. He is ABO A and cPRA pending.   3. Type 2 diabetes - currently managed with U500 via insulin pump. No hypoglycemic unawareness. He is aware that he would likely require more insulin post-kidney transplant.  4. Cardiac risk - he has history of significant CAD s/p CABG in 1992 and stenting in 2000, 2004, and 2014. His last stress test in August 2016 showed an anteroseptal defect that was fixed and thought to be consistent with prior MI. His last ECHO in February 2017 showed an EF of 55%, mild aortic stenosis, and severe LA dilatation. PA pressures were not able to be accurately assessed. He is currently on Plavix and aspirin. He would need cardiac risk assessment.   5. BMI 35 but with significant abdominal obesity - we discussed that he may have more weight to lose to be considered for transplant given central obesity. Appreciate transplant surgery and dietician input. We discussed importance of weight loss for overall general health and to help reduce postoperative wound complications.  6. PVD and possible claudication symptoms - imaging 10+ years ago indicated mild occlusive disease of his BLE. He would need further iliac/femoral imaging to assess vascular targets for transplant.  7. Functional status - limited to  walking 25-50 feet by chronic exertional dyspnea and BLE peripheral neuropathy. He is currently following with neurology for peripheral neuropathy and not taking any medications for this. Encouraged him to continue following with neurology. Symptoms may be due to claudication, and as above, he would need iliac/femoral imaging should he be a transplant candidate. His symptoms could be addressed by his PCP if he is not a candidate.   8. Adrenal adenoma - stable in size since at least 2009. Followed by his local endocrinologist.  9. Health maintenance - would need records of most recent colonoscopy to determine if it is up to date.     Discussed the risks and benefits of a transplant, including the risk of surgery and immunosuppression medications.  Patients overall evaluation will be discussed in the Transplant Program's regular meeting with a final recommendation on the patients suitability for transplant to be made at that time.  Patient was seen in conjunction with Dr. Williams Montes as part of a shared visit.      Evaluation:  Alessandro Moses was seen in consultation at the request of Dr. Paresh Qureshi for evaluation as a potential kidney transplant recipient.    Reason for Visit:  Alessandro Moses is a 77-year-old male with ESKD from presumed diabetic nephropathy, who presents for kidney transplant evaluation.    HPI:  Alessandro Moses is a 77-year-old male with ESKD from presumed diabetic nephropathy on dialysis since February 2017, type 2 diabetes since 2000 (also complicated by peripheral neuropathy), CAD s/p 2 vessel CABG in 1992 and stenting post-CABG (2000, 2004, and 2014), PVD (mild occlusive disease BLE on 2003 arterial doppler), hypertension, obesity, adrenal adenoma, nephrolithiasis (s/p basket retrieval 2003), BENNY, chronic back pain s/p T7-L1 spinal fusion and history of laminectomy and foraminotomies, gout (on allopurinol), and BPH who presents for continued kidney transplant evaluation. He was originally  evaluated in May 2015 but not listed due to high BMI (40 around time of initial evaluation).    He has since started dialysis in February 2017, which has been going well. He is currently using an AVF and still has a CVC in place. He has a small amount of urine output. No dysuria, hematuria, or trouble emptying his bladder. He has intentionally lost 55 lbs, some of which he believes was fluid weight that came off with initiation of dialysis. His appetite has been down some since starting dialysis but he still manages to eat several small meals per day. He has intermittent nausea and vomiting. No diarrhea or constipation. Nausea improves with eating or drinking something and does not affect his ability to take his medications. He is currently using an insulin pump with U500. He checks blood sugars 3-5 times per day with typical sugars averaging around 130-140. He continues to follow with endocrinology for this, as well as for a right-sided adrenal adenoma that has been stable in size since at least 2009. He recently fell and sustained a T4/5 fracture that is being managed conservatively by neurosurgery and does not limit him physically. He is, however, limited physically by BLE peripheral neuropathy and chronic exertional dyspnea that has been present for several years. He is only able to walk 25-50 feet before needing to rest. He also notes that he experiences BLE pain with exertion that is different in quality from his peripheral neuropathy. He has not experienced chest pain with exertion since starting on ranolazine in 2015. He has not required sublingual nitroglycerin for several months. His last coronary angiogram requiring stenting was in December 2014 and showed LMCA 20% stenosis, 40% stenosis mid and distal LAD after LIMA touchdown, LCx stents patent, and proximal RCA 50-60% stenosis (JOSE D at that time). His last stress test in August 2016 showed an anteroseptal defect that was fixed and thought to be  consistent with prior MI. His last ECHO in February 2017 showed an EF of 55%, mild aortic stenosis, and severe LA dilatation. PA pressures were not able to be accurately assessed. He is currently on Plavix and aspirin. Overall, he is doing okay. He still works full time publishing [employer]. He is also quite active in the community. His energy level is stable but does dip on dialysis days. He remains interested in transplant for improved quality of life.         Kidney Disease Hx:        Kidney Disease Dx: presumed diabetic nephropathy       Biopsy Proven: No         On Dialysis: Yes, Date initiated: February 2017 and Dialysis Type: [facility];       Primary Nephrologist: Dr. Clark         [facility] Hx:       h/o HTN: Yes         h/o DM:  Yes        h/o Protein in Urine: Yes        h/o Blood in Urine:  No       h/o Kidney Stones:  Yes        h/o UTI: No       h/o Chronic NSAID Use: No         Previous Transplant Hx:        No         Transplant Sensitization Hx:       Previous Tx: No       Blood Transfusion: Does not know             Uremic Symptoms:       Fatigue: Yes; Cold: No; Nausea: No; Poor Appetite: No; Metallic Taste: No; Edema: No;         Cardiovascular Hx:       h/o Cardiac Issues: Yes; CAD s/p CABG and stenting       Exercise Tolerance: shortness of breath with exertion.         Health Maintenance:       Colonoscopy: need records and Dental: need records         Potential Donor(s): No    ROS:  A comprehensive review of systems was obtained and negative, except as noted in the HPI or PMH.    PMH:   Medical record was reviewed and PMH was discussed with patient and noted below.  Past Medical History:   Diagnosis Date     Adrenal adenoma      Anemia in CKD (chronic kidney disease)      Angina pectoris (H)      Coronary artery disease      Diabetes mellitus, type II (H)      End stage kidney disease (H)      Gallstones      Gout 6/4/2004     Hyperlipidemia      Hypertension      Kidney  stone      Lumbar stenosis      Obesity (BMI 35.0-39.9 without comorbidity)      BENNY (obstructive sleep apnea)      Peripheral vascular disease (H)      Pure hypercholesterolemia      Right lumbar radiculopathy      Secondary hyperparathyroidism (H)        PSH:   Past Surgical History:   Procedure Laterality Date     aortocoronary bypass  1992     CREATE FISTULA ARTERIOVENOUS UPPER EXTREMITY       JOINT REPLACEMENT Bilateral      LAMINECTOMY LUMBAR THREE+ LEVELS  4/29/12    with bilateral foraminotomies L3-L4 and L4-L5     Personal or family history of bleeding or anesthesia problems: No    Family Hx:  Family History   Problem Relation Age of Onset     Coronary Artery Disease Father      DIABETES Father        Personal Hx:   Social History     Social History     Marital status:      Spouse name: N/A     Number of children: N/A     Years of education: N/A     Occupational History     Not on file.     Social History Main Topics     Smoking status: Never Smoker     Smokeless tobacco: Not on file     Alcohol use Not on file     Drug use: Not on file     Sexual activity: Not on file     Other Topics Concern     Not on file     Social History Narrative       Allergies:  Allergies   Allergen Reactions     Hydrocodone-Acetaminophen Nausea and Vomiting       Medications:  Prior to Admission medications    Medication Sig Start Date End Date Taking? Authorizing Provider   Sevelamer Carbonate (RENVELA PO)     Reported, Patient   Cholecalciferol (VITAMIN D3 PO) Take by mouth daily    Reported, Patient   oxyCODONE-acetaminophen (PERCOCET) 5-325 MG per tablet Take by mouth every 4 hours as needed for moderate to severe pain    Reported, Patient   fluticasone (FLOVENT DISKUS) 50 MCG/BLIST AEPB Inhale 1 puff into the lungs every 12 hours    Reported, Patient   allopurinol (ZYLOPRIM) 100 MG tablet TAKE TWO TABLETS BY MOUTH ONCE DAILY 1/22/15   Reported, Patient   clopidogrel (PLAVIX) 75 MG tablet TAKE ONE TABLET BY MOUTH EVERY  "DAY 8/14/14   Reported, Patient   aspirin 81 MG chewable tablet Take 81 mg by mouth 3/1/13   Reported, Patient   rosuvastatin (CRESTOR) 10 MG tablet TAKE ONE TABLET BY MOUTH ONCE DAILY 4/29/15   Reported, Patient   doxazosin (CARDURA) 8 MG tablet Take 4 mg by mouth  2/13/15   Reported, Patient   finasteride (PROSCAR) 5 MG tablet Take 5 mg by mouth 3/23/15   Reported, Patient   insulin reg HIGH CONC (HUMULIN R U-500 HIGH CONC) 500 Units/mL VIAL Use 35-40 units Regular U-500 insulin per day via insulin pump (actual 175-200 units/day) 7/8/14   Reported, Patient   Isosorbide Mononitrate  MG TB24 Take 120 mg by mouth 8/25/14   Reported, Patient   losartan (COZAAR) 100 MG tablet Take 100 mg by mouth 8/25/14   Reported, Patient   Multiple vitamin TABS  10/5/06   Reported, Patient   omega 3 1200 MG CAPS  1/21/15   Reported, Patient   nitroglycerin (NITROSTAT) 0.4 MG SL tablet Place 1 tab under tongue as needed for chest pain up to 3 doses, 5 min appart 12/19/14   Reported, Patient   ranolazine (RANEXA) 500 MG 12 hr tablet Take 1,000 mg by mouth 2 times daily  1/21/15   Reported, Patient   torsemide (DEMADEX) 20 MG tablet Take 20mg in morning and 20 mg  in evening 2/17/15   Reported, Patient   blood glucose test strip Test 6-7 times/day using insulin pump.  Counts include 234 beds at the [facility] approved 7/8/14  Uses Free style test strips 12/4/14   Reported, Patient   alfuzosin (UROXATRAL) 10 MG 24 hr tablet Take 10 mg by mouth 9/23/14   Reported, Patient       Vitals:  /68  Pulse 101  Temp 98.4  F (36.9  C)  Ht 1.803 m (5' 11\")  Wt 114.7 kg (252 lb 12.8 oz)  SpO2 99%  BMI 35.26 kg/m2    Exam:  GENERAL APPEARANCE: alert and no distress  HENT: mouth without ulcers or lesions. No sign of oral infection  LYMPHATICS: no cervical or supraclavicular nodes  RESP: lungs clear to auscultation - no rales, rhonchi or wheezes  CV: regular rhythm, normal rate, no rub, no murmur  EDEMA: no LE edema bilaterally  ABDOMEN: soft, nondistended, " nontender, abdominal obesity  MS: extremities normal - no gross deformities noted, no evidence of inflammation in joints, no muscle tenderness  SKIN: no rash  Limited femoral pulses exam due to body habitus        Patient was seen by myself, Dr. Jono Montes, in conjunction with Lillian White, as part of a shared visit.    I personally reviewed past medical and surgical history, vital signs, medications and labs.  Present and past medical history, along with significant physical exam findings were all reviewed with PORTER.    My zheng findings:  Alessandro Moses is a 77 year old year old male with ESKD from Type II Diabetes, who presents for Kidney transplant evaluation.    Patient is a 76 yo male with hx of ESRD due to DMII now on dialysis for the last year.     He is working on weight loss.    He has CAD s/p CABG with last angiogram in 2014?. He is on ranolozine chronically.     He continues to have chronic MONGE.    He denies cp. No n/v, no f/s/c.     Key management decisions made by me and discussed with PORTER:  1. Kidney transplant evaluation - patient is a poor candidate overall. Benefits of a living donor transplant were discussed. We spent the majority of our visit discussing mortality in a 76 yo dialysis patient with hx of CAD/DMII while waiting for a kidney as well as the likelihood of health deterioration. I voiced my concern given his limited functional status due to what his perception is neuropathy limiting his walking to 50 feet before he stops for pain of Lower extremities. We have recommended evaluation for PAD limiting his mobility in conjunction to neuropathy evaluation and management. Once optimized, I would risk stratify with a 6 minute walk. This is a non-invasive way of looking at functional ability. Walking >300 meters would be a minimum.   2. 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   3. CAD s/ cabg: needs cardiology assessment.  4. Morbid obesity: BMI now down to 35. Continue to work on weight  loss.  5. Adrenal nodule: stable appearance on CT abdomen. Will continue to monitor.       Again, thank you for allowing me to participate in the care of your patient.      Sincerely,    ELAINA

## 2017-11-09 NOTE — LETTER
2017       RE: Alessandro Moses  [address]  [place] [address]     Dear Colleague,    Thank you for referring your patient, Alessandro Moses, to the [facility] SOLID ORGAN TRANSPLANT at [facility]. Please see a copy of my visit note below.    RE: Alessandro Moses    [facility]# [id number]        I saw your patient, Alessandro Moses, in consultation in our pretransplant clinic.  He was at clinic to discuss care for his end-stage renal disease.  He was at clinic with his wife.  He had previously been at class, so did not attendour pretransplant class today.       We talked about the pros and cons of transplantation vs. dialysis.  We discussed the fact that it was important that he think about the pros and cons of each treatment option and make an active decision.  We also discussed the fact that the two were interconnected and he may need to go on dialysis before transplant (if he chose to have a transplant) and that if the transplant failed, he might need dialysis before another transplant.       We also discussed the fact that if he chose to have a transplant, he would need to decide between going on the wait list for a  donor transplant vs. having a living donor transplant.  We talked about the pros and cons of each option.  Although I didn't express an opinion regarding transplantation or dialysis, I suggested that if he chose to have a transplant, a living donor transplant would be preferable in that the surgery is the same, the immunosuppressive drugs and the risks are the same, but the transplant could be done sooner and the results are better.  I told him that the wait for  donor kidney was approximately five years for patients who are newly put on the waiting list.  In addition, we talked about the fact that the disadvantage of a living donor transplant was the risk to the donor.       Because he was interested in living donation, we spent some time  "discussing donor risks, including the risks of mortality, morbidity, and long-term risks with a single kidney.     Finally, we had considerable discussion as to whether or not he would benefit by moving forward with a transplant. I asked him to discuss this with his nephrologist. This is important given he says that he is tolerating dialysis well. Given his age and the current waiting time for a  donor transplant, I would think that the only option would be a living donor transplant. However, I think that this is realistically a \"+/-\" choice for him.     I attempted to answer any remaining questions.  I also told him that should he have any questions, he should feel free to contact us.  We would be glad to answer any questions either over the phone or at another clinic visit.  His transplant coordinator is Kallie Hinton and may be reached at [phone].  Thank you for the opportunity to see him.     I spent 25 minutes with this patient.  Over 90% of that time was spent in counseling and coordination of care.             Yours truly,               Paresh Qureshi MD         Professor of Surgery         ([phone])    AJ/st    Again, thank you for allowing me to participate in the care of your patient.      Sincerely,    ELAINA      "

## 2017-11-10 LAB — PRA SINGLE ANTIGEN IGG ANTIBODY: NORMAL

## 2017-11-15 ENCOUNTER — COMMITTEE REVIEW (OUTPATIENT)
Dept: TRANSPLANT | Facility: CLINIC | Age: 77
End: 2017-11-15

## 2017-11-15 NOTE — COMMITTEE REVIEW
"Abdominal Patient Discussion Note Transplant Coordinator: Kallie Hinton  Transplant Surgeon:   Matias Mcginnis    Referring Physician: Lester Perry    Committee Review Members:  Nephrology Jono Montes MD, Romain Samuel, APRN CNP, Jose Elias Banks MD   Nutrition Joanne Negro,    Pharmacy Luly Trinh, [facility], located within [facility]    - Clinical Monserrat Jo Downs, [facility] [place] [facility]   Transplant Lillian White PA-C, Araseli Cabrera, PAULETTE, Maddy Newman LPN, Prema Khan, PAULETTE, Kallie Hinton, RN, Alyson España, PAULETTE, Maria Eugenia Echevarria, PAULETTE, Sven Ortiz MD       Additional Discussion Notes and Findings:      Pt was evaluated in  WT 42 not listed   Dialysis started 2017   Live donor denied per donor team       Cindy.Kiera met pt 2017  WT loss meets BMI 35 criteria   PVD and possible claudication symptoms - imaging 10+ years ago indicated mild occlusive disease of his BLE. He would need further iliac/femoral imaging to assess vascular targets for transplant.  Kiera Note:            Finally, we had considerable discussion as to whether or not he would benefit by moving forward with a transplant. I asked him to discuss this with his nephrologist. This is important given he says that he is tolerating dialysis well. Given his age and the current waiting time for a  donor transplant, I would think that the only option would be a living donor transplant. However, I think that this is realistically a \"+/-\" choice for him.    Keys note: 1. Kidney transplant evaluation - patient is a poor candidate overall. Benefits of a living donor transplant were discussed. We spent the majority of our visit discussing mortality in a 78 yo dialysis patient with hx of CAD/DMII while waiting for a kidney as well as the likelihood of health deterioration. I voiced my concern given his limited functional status due to what his perception is neuropathy limiting his walking to 50 feet before he stops for " pain of Lower extremities. We have recommended evaluation for PAD limiting his mobility in conjunction to neuropathy evaluation and management. Once optimized, I would risk stratify with a 6 minute walk. This is a non-invasive way of looking at functional ability. Walking >300 meters would be a minimum.   2. 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   3. CAD s/ cabg: needs cardiology assessment.  4. Morbid obesity: BMI now down to 35. Continue to work on weight loss.  5. Adrenal nodule: stable appearance on CT abdomen. Will continue to monitor.    Team did not discuss on 11/15, but was discussed on  2017.    Team with Dr. Ortiz, Dr. Montes, Dr. Banks and ALANA Bennett determined his medical condition was high of risk for live or  kidney transplant due to multiple comorbidities: Diabetes Type II; Coronary artery disease; Weight Body Mass Index 35 ( Patient to continue loss); functional status limited when walking short distance; Peripheral neuropathy      I called Mr. Moses on 2017 and reported I would send him a letter.  He thanked me for all the care given and calls made to him.    Contacted patient to review outcome of selection committee meeting (See selection committee encounter).   Explained to patient that hePatient will not be listed because they are not a candidate-will receive:     -A letter indicating why they did not meet criteria for transplant at [facility].  Confirmed that patient has contact information for additional questions or concerns.

## 2017-11-15 NOTE — LETTER
November 22, 2017    Alessandro Moses  [address]  [place] [address]      Dear Mr. Moses,   The purpose of this letter is to let you know that on 11/22/2017 the [facility] Multi-Disciplinary Selection Team reviewed the results of your transplant evaluation.  Based on the results of your evaluation and the selection criteria used by our program, the decision was made to decline you as a candidate.  Your name will not be placed on the [employer] [facility] ([facility])  Wait List for  kidney transplant.  This is because of your multiple comorbidities:  1. Diabetes Type II;  2. Coronary artery disease;   3. Abdominal Obesity.  You meet minimum Body Mass Index of 35. Team advised continued weight loss for optimal health;   4.  Peripheral Vascular disease with Neuropathy, and limited functional status due to shortness of breath when walking a short distance;   5. Stable adrenal adenoma ( Continue to monitor adrenal nodule with Cat Scan, per PCP)      If you would like to discuss the decision, or if your medical status changes you may schedule a return visits with Sven Ortiz MD by calling [phone] option 5 and asking to speak to your transplant coordinator for an appointment to be arranged.     We recommend that you continue to follow up with your primary care doctor in order to manage your health concerns.  Enclosed is a letter from [employer] which describes the services offered to patients by [employer] and the [facility] Network.          Thank you for allowing us to participate in your care, Mr. Moses, you have been a rachel to work with as you have done all we asked of you.  The team decision was made with the utmost of concern towards your best long term health and well being.  We/I wish you well.  Sincerely,    Kallie Hinton, BSN RN on behalf of the kidney transplant team   Kidney Transplant Team  Enclosure:  OS Letter  CC: Hebert Anand MD; Sg  MD Eduardo; Ilan Loja MD; Mark Tony MD, [facility]

## 2017-11-28 ENCOUNTER — TELEPHONE (OUTPATIENT)
Dept: TRANSPLANT | Facility: CLINIC | Age: 77
End: 2017-11-28

## 2017-11-28 NOTE — TELEPHONE ENCOUNTER
Kelby Schuster, RN with [facility] called to state the cardiologist 11/30/2017 appt was completed and Dr. Patel put in his risk assessment in April 2017 note after reviewing outside echocardiogram and stress test.      I called Mr. Moses and he requests this appt be cancelled. I sent note to .  jacqui Schuster

## 2019-09-30 ENCOUNTER — HEALTH MAINTENANCE LETTER (OUTPATIENT)
Age: 79
End: 2019-09-30

## 2020-03-15 ENCOUNTER — HEALTH MAINTENANCE LETTER (OUTPATIENT)
Age: 80
End: 2020-03-15

## 2021-01-15 ENCOUNTER — HEALTH MAINTENANCE LETTER (OUTPATIENT)
Age: 81
End: 2021-01-15

## 2021-05-09 ENCOUNTER — HEALTH MAINTENANCE LETTER (OUTPATIENT)
Age: 81
End: 2021-05-09

## 2021-08-29 ENCOUNTER — HEALTH MAINTENANCE LETTER (OUTPATIENT)
Age: 81
End: 2021-08-29

## 2021-10-24 ENCOUNTER — HEALTH MAINTENANCE LETTER (OUTPATIENT)
Age: 81
End: 2021-10-24

## 2022-04-10 ENCOUNTER — HEALTH MAINTENANCE LETTER (OUTPATIENT)
Age: 82
End: 2022-04-10

## 2022-06-05 ENCOUNTER — HEALTH MAINTENANCE LETTER (OUTPATIENT)
Age: 82
End: 2022-06-05

## 2022-10-15 ENCOUNTER — HEALTH MAINTENANCE LETTER (OUTPATIENT)
Age: 82
End: 2022-10-15

## 2023-06-01 ENCOUNTER — HEALTH MAINTENANCE LETTER (OUTPATIENT)
Age: 83
End: 2023-06-01

## 2023-06-11 ENCOUNTER — HEALTH MAINTENANCE LETTER (OUTPATIENT)
Age: 83
End: 2023-06-11

## 2024-01-07 ENCOUNTER — HEALTH MAINTENANCE LETTER (OUTPATIENT)
Age: 84
End: 2024-01-07